# Patient Record
Sex: FEMALE | Race: WHITE | NOT HISPANIC OR LATINO | Employment: OTHER | URBAN - METROPOLITAN AREA
[De-identification: names, ages, dates, MRNs, and addresses within clinical notes are randomized per-mention and may not be internally consistent; named-entity substitution may affect disease eponyms.]

---

## 2019-10-18 ENCOUNTER — OFFICE VISIT (OUTPATIENT)
Dept: URGENT CARE | Age: 74
End: 2019-10-18
Payer: COMMERCIAL

## 2019-10-18 VITALS
BODY MASS INDEX: 22.5 KG/M2 | HEART RATE: 69 BPM | SYSTOLIC BLOOD PRESSURE: 167 MMHG | RESPIRATION RATE: 18 BRPM | HEIGHT: 63 IN | WEIGHT: 127 LBS | OXYGEN SATURATION: 98 % | DIASTOLIC BLOOD PRESSURE: 77 MMHG | TEMPERATURE: 98.1 F

## 2019-10-18 DIAGNOSIS — J06.9 ACUTE UPPER RESPIRATORY INFECTION: Primary | ICD-10-CM

## 2019-10-18 DIAGNOSIS — J01.40 ACUTE NON-RECURRENT PANSINUSITIS: ICD-10-CM

## 2019-10-18 PROCEDURE — 99203 OFFICE O/P NEW LOW 30 MIN: CPT | Performed by: FAMILY MEDICINE

## 2019-10-18 PROCEDURE — G0463 HOSPITAL OUTPT CLINIC VISIT: HCPCS | Performed by: FAMILY MEDICINE

## 2019-10-18 RX ORDER — BENZONATATE 200 MG/1
200 CAPSULE ORAL 3 TIMES DAILY PRN
Qty: 30 CAPSULE | Refills: 0 | Status: SHIPPED | OUTPATIENT
Start: 2019-10-18

## 2019-10-18 RX ORDER — AMOXICILLIN AND CLAVULANATE POTASSIUM 875; 125 MG/1; MG/1
1 TABLET, FILM COATED ORAL EVERY 12 HOURS SCHEDULED
Qty: 20 TABLET | Refills: 0 | Status: SHIPPED | OUTPATIENT
Start: 2019-10-18 | End: 2019-10-28

## 2019-10-18 NOTE — PROGRESS NOTES
[facility]        NAME: Juanito Durbin is a 76 y o  female  : 1945    MRN: [id number]  DATE: 2019  TIME: 11:34 AM    Assessment and Plan   Acute upper respiratory infection [J06 9]  1  Acute upper respiratory infection  benzonatate (TESSALON) 200 MG capsule   2  Acute non-recurrent pansinusitis  amoxicillin-clavulanate (AUGMENTIN) 875-125 mg per tablet         Patient Instructions     Patient Instructions   Augmentin twice a day until finished (please take probiotics)  Tessalon Perles 2 to 3 times a day (every 8-12 hours) as needed for cough  Try Flonase nasal spray 1 spray in each nostril once or twice a day  Tylenol, or ibuprofen (Advil/Motrin) as needed  Recheck/follow-up with family physician as needed  Guera Esparza   [phone]    Please go to the hospital emergency department if needed  Follow up with PCP in 3-5 days  Proceed to  ER if symptoms worsen  Chief Complaint     Chief Complaint   Patient presents with    Cold Like Symptoms     c/o sinus issues         History of Present Illness       Congestion, cough, sinus pressure; patient states she recently moved here from [address] [place] [address] [place]: Positive for congestion and sinus pressure  Respiratory: Positive for cough  Cardiovascular: Negative  Musculoskeletal: Negative  Skin: Negative  Neurological: Negative            Current Medications       Current Outpatient Medications:     amoxicillin-clavulanate (AUGMENTIN) 875-125 mg per tablet, Take 1 tablet by mouth every 12 (twelve) hours for 20 doses, Disp: 20 tablet, Rfl: 0    benzonatate (TESSALON) 200 MG capsule, Take 1 capsule (200 mg total) by mouth 3 (three) times a day as needed for cough for up to 30 doses, Disp: 30 capsule, Rfl: 0    Current Allergies     Allergies as of 10/18/2019    (No Known Allergies)            The following portions of the patient's history were reviewed and updated as appropriate: allergies, current medications, past family history, past medical history, past social history, past surgical history and problem list      Past Medical History:   Diagnosis Date    Kidney infection        Past Surgical History:   Procedure Laterality Date    BREAST SURGERY      CHOLECYSTECTOMY      HYSTERECTOMY      THROAT SURGERY         History reviewed  No pertinent family history  Medications have been verified  Objective   /77   Pulse 69   Temp 98 1 °F (36 7 °C)   Resp 18   Ht 5' 3" (1 6 m)   Wt 57 6 kg (127 lb)   SpO2 98%   BMI 22 50 kg/m²        Physical Exam     Physical Exam   Constitutional: She is oriented to person, place, and time  She appears well-developed and well-nourished  HENT:   Right Ear: External ear normal    Left Ear: External ear normal    Nasal congestion; discomfort over the sinuses; injection of the oropharynx   Neck: Normal range of motion  Neck supple  Cardiovascular: Normal rate, regular rhythm and normal heart sounds  Pulmonary/Chest: Effort normal    Coarse breath sounds with scattered rhonchi   Neurological: She is alert and oriented to person, place, and time  No nuchal rigidity   Skin:   Fair turgor   Psychiatric: She has a normal mood and affect  Her behavior is normal    Nursing note and vitals reviewed

## 2019-10-18 NOTE — PATIENT INSTRUCTIONS
Augmentin twice a day until finished (please take probiotics)  Tessalon Perles 2 to 3 times a day (every 8-12 hours) as needed for cough  Try Flonase nasal spray 1 spray in each nostril once or twice a day  Tylenol, or ibuprofen (Advil/Motrin) as needed  Recheck/follow-up with family physician as needed  Ashley Richard   [phone]    Please go to the hospital emergency department if needed

## 2021-03-08 ENCOUNTER — CONSULT (OUTPATIENT)
Dept: PAIN MEDICINE | Facility: CLINIC | Age: 76
End: 2021-03-08
Payer: COMMERCIAL

## 2021-03-08 VITALS
HEIGHT: 63 IN | BODY MASS INDEX: 25.98 KG/M2 | TEMPERATURE: 98.6 F | SYSTOLIC BLOOD PRESSURE: 157 MMHG | HEART RATE: 67 BPM | WEIGHT: 146.6 LBS | DIASTOLIC BLOOD PRESSURE: 87 MMHG

## 2021-03-08 DIAGNOSIS — G89.29 CHRONIC RIGHT-SIDED LOW BACK PAIN WITH RIGHT-SIDED SCIATICA: ICD-10-CM

## 2021-03-08 DIAGNOSIS — M54.16 LUMBAR RADICULOPATHY: ICD-10-CM

## 2021-03-08 DIAGNOSIS — M54.41 CHRONIC RIGHT-SIDED LOW BACK PAIN WITH RIGHT-SIDED SCIATICA: ICD-10-CM

## 2021-03-08 DIAGNOSIS — G89.4 CHRONIC PAIN SYNDROME: Primary | ICD-10-CM

## 2021-03-08 PROCEDURE — 99204 OFFICE O/P NEW MOD 45 MIN: CPT | Performed by: ANESTHESIOLOGY

## 2021-03-08 RX ORDER — COVID-19 ANTIGEN TEST
KIT MISCELLANEOUS
COMMUNITY

## 2021-03-08 NOTE — PATIENT INSTRUCTIONS
Magnetic Resonance Imaging   WHAT YOU NEED TO KNOW:   What do I need to know about magnetic resonance imaging (MRI)? An MRI is a test that uses magnetic fields and radio waves to take pictures inside your body  An MRI is used to see blood vessels, tissue, muscles, and bones  It can also show organs, such as your heart, lungs, or liver  An MRI can help your healthcare provider diagnose or treat a medical condition  It does not use radiation  How do I prepare for an MRI? · Your healthcare provider will tell you which medicines to take or not take on the day of your MRI  He or she may give you medicine to help you feel calm and relaxed during the MRI  · Tell your provider if you know or think you are pregnant  · Tell your provider if you have any metal in your body, such as a pacemaker, implant, or aneurism clip  Tell him or her if you have a tattoo or wear a medicine patch  · Remove any metal items, such as hair clips, jewelry, glasses, hearing aids, or dentures before you enter the MRI room  What will happen during an MRI? Your healthcare provider will ask you to lie on a table  Contrast liquid may be used to help a body part show up more clearly  The table will be moved into an open space in the middle of the machine  You will need to lie still during the MRI  It is normal to hear knocking, thumping, or clicking noises from the machine  What are the risks of an MRI? An MRI may cause a metal object in your body to move out of place and cause serious injury, or stop working properly  The contrast liquid may cause nausea, a headache, lightheadedness, or pain at the injection site  You could have an allergic reaction to the contrast liquid  CARE AGREEMENT:   You have the right to help plan your care  Learn about your health condition and how it may be treated  Discuss treatment options with your healthcare providers to decide what care you want to receive   You always have the right to refuse treatment  The above information is an  only  It is not intended as medical advice for individual conditions or treatments  Talk to your doctor, nurse or pharmacist before following any medical regimen to see if it is safe and effective for you  © Copyright [address] Information is for End User's use only and may not be sold, redistributed or otherwise used for commercial purposes   All illustrations and images included in CareNotes® are the copyrighted property of A D A M , Inc  or [address] [place] [address]

## 2021-03-08 NOTE — PROGRESS NOTES
Scribe Attestation    I,:  LIBERTAD Guzman am acting as a scribe while in the presence of the attending physician :       I,:  Clarke Rider MD personally performed the services described in this documentation    as scribed in my presence :         Assessment:  1  Chronic pain syndrome    2  Chronic right-sided low back pain with right-sided sciatica    3  Lumbar radiculopathy - Right        Plan:  Orders Placed This Encounter   Procedures    MRI lumbar spine without contrast     Standing Status:   Future     Standing Expiration Date:   3/8/2025     Scheduling Instructions: There is no preparation for this test  Please leave your jewelry and valuables at home, wedding rings are the exception  Magnetic nail polish must be removed prior to arrival for your test  Please bring your insurance cards, a form of photo ID and a list of your medications with you  Arrive 15 minutes prior to your appointment time in order to register  Please bring any prior CT or MRI studies of this area that were not performed at a [facility]  To schedule this appointment, please contact Central Scheduling at [phone]  Prior to your appointment, please make sure you complete the MRI Screening Form when you e-Check in for your appointment  This will be available starting 7 days before your appointment in [facility]  You may receive an e-mail with an activation code if you do not have a [email or web address] account  If you do not have access to a device, we will complete your screening at your appointment  Order Specific Question:   What is the patient's sedation requirement? Answer:   Unknown     Order Specific Question:   Release to patient through [email or web address]     Answer:   Immediate     Order Specific Question:   Is order priority selected as STAT?      Answer:   No     Order Specific Question:   Reason for Exam (FREE TEXT)     Answer:   lumbar radiculopathy       New Medications Ordered This Visit Medications    Naproxen Sodium (Aleve) 220 MG CAPS     Sig: Take by mouth       My impressions and treatment recommendations were discussed in detail with the patient, who verbalized understanding and had no further questions  Given that the patient does not want to start a daily medication at this time, or start physical therapy without MRI results, I feel it is appropriate to order an MRI the lumbar spine and then have the patient come back for re-evaluation of pain and review of MRI results  We will further discuss treatment option plans at that time  Follow-up is planned after the MRI or sooner as warranted  Discharge instructions were provided  I personally saw and examined the patient and I agree with the above discussed plan of care  History of Present Illness:    Hai Sargent is a 76 y o  female who presents to [facility] for initial evaluation of the above stated pain complaints  The patient has a past medical and chronic pain history as outlined in the assessment section  She was self-referred  The patient states she fell down some steps in January of 2021  Ever since then she has been having low back pain  At the time she did not get checked out by her doctor or the emergency room  She has had no imaging done on her low back since the injury  She states that she did not hit her head, and she did not have any immediate known injury  She states she laid there for a few minutes after falling and then was able to get up  She states that ever since her injury she has been unable to continue her exercise program of regular walking  She develops shooting pain down her leg when she walks that is relieved when she stops walking  She states she is unable to go up and down stairs using her right leg and must use her left leg as the weight-bearing leg  Her pain today is an 8/10 and it is intermittent there is no typical pattern to her pain    She describes the quality of her pain as burning, sharp, and throbbing  She takes Aleve as needed which she states helps  She has not tried physical therapy or any other kinds of medications  She denies using topical pain relievers, ice, or heat  She states that she is not interested in taking daily medications for the neuropathic pain, she is not interested in doing physical therapy at the moment, and she does not want to discuss injections until she has the results of her MRI  Review of Systems:    Review of Systems   Constitutional: Negative for fever and unexpected weight change  HENT: Negative for trouble swallowing  Eyes: Negative for visual disturbance  Respiratory: Negative for shortness of breath and wheezing  Cardiovascular: Negative for chest pain and palpitations  Gastrointestinal: Negative for constipation, diarrhea, nausea and vomiting  Endocrine: Negative for cold intolerance, heat intolerance and polydipsia  Genitourinary: Negative for difficulty urinating and frequency  Musculoskeletal: Positive for arthralgias and myalgias  Negative for gait problem and joint swelling  Skin: Negative for rash  Neurological: Negative for dizziness, seizures, syncope, weakness and headaches  Hematological: Does not bruise/bleed easily  Psychiatric/Behavioral: Negative for dysphoric mood  All other systems reviewed and are negative  Patient Active Problem List   Diagnosis    Lumbar radiculopathy - Right       Past Medical History:   Diagnosis Date    Kidney infection        Past Surgical History:   Procedure Laterality Date    BREAST SURGERY      CHOLECYSTECTOMY      HYSTERECTOMY      THROAT SURGERY         History reviewed  No pertinent family history      Social History     Occupational History    Not on file   Tobacco Use    Smoking status: Current Every Day Smoker     Types: Cigarettes    Smokeless tobacco: Never Used   Substance and Sexual Activity    Alcohol use: Yes     Comment: Cuca Rojas Drug use: Never    Sexual activity: Not on file         Current Outpatient Medications:     Naproxen Sodium (Aleve) 220 MG CAPS, Take by mouth, Disp: , Rfl:     benzonatate (TESSALON) 200 MG capsule, Take 1 capsule (200 mg total) by mouth 3 (three) times a day as needed for cough for up to 30 doses (Patient not taking: Reported on 3/8/2021), Disp: 30 capsule, Rfl: 0    No Known Allergies    Physical Exam:    /87   Pulse 67   Temp 98 6 °F (37 °C)   Ht 5' 3" (1 6 m)   Wt 66 5 kg (146 lb 9 6 oz)   BMI 25 97 kg/m²     Constitutional: normal, well developed, well nourished, alert, in no distress and non-toxic and no overt pain behavior  Eyes: anicteric  HEENT: grossly intact  Neck: supple, symmetric, trachea midline and no masses   Pulmonary:even and unlabored  Cardiovascular: No visible edema  Skin:Normal without rashes or lesions and well hydrated  Psychiatric:Mood and affect appropriate  Neurologic:Cranial Nerves II-XII grossly intact  Musculoskeletal:normal     Lumbar Spine Exam    Appearance:  Normal lordosis  Palpation/Tenderness:  right lumbar paraspinal tenderness  Sensory:  no sensory deficits noted  Range of Motion:  Flexion: Moderately limited  with pain  Extension:  Moderately limited  with pain  Lateral Flexion - Left:  Moderately limited  with pain  Lateral Flexion - Right:  Moderately limited  with pain  Rotation - Left:  Moderately limited  with pain  Rotation - Right:  Moderately limited  with pain  Motor Strength:  Left hip flexion:  5/5  Left hip extension:  5/5  Right hip flexion:  5/5  Right hip extension:  5/5  Left knee flexion:  5/5  Left knee extension:  5/5  Right knee flexion:  5/5  Right knee extension:  5/5  Left foot dorsiflexion:  5/5  Left foot plantar flexion:  5/5  Right foot dorsiflexion:  5/5  Right foot plantar flexion:  5/5   Lumbar facet loading is negative bilaterally    Reflexes:  Left Patellar:  2+   Right Patellar:  2+   Left Achilles:  2+   Right Achilles:  2+   Special Tests:  Left Straight Leg Test:  negative  Right Straight Leg Test:  negative  Left Brooks's Maneuver:  negative  Right Brooks's Maneuver:  negative  Left Pelvic Distraction Test:  negative  Right Pelvic Distraction Test:  negative  Left Piriformis Stretch Test:  negative  Right Piriformis Stretch Test:  negative    Imaging  MRI lumbar spine without contrast    (Results Pending)       Orders Placed This Encounter   Procedures    MRI lumbar spine without contrast